# Patient Record
Sex: FEMALE | Race: WHITE | URBAN - METROPOLITAN AREA
[De-identification: names, ages, dates, MRNs, and addresses within clinical notes are randomized per-mention and may not be internally consistent; named-entity substitution may affect disease eponyms.]

---

## 2018-12-13 ENCOUNTER — APPOINTMENT (RX ONLY)
Dept: URBAN - METROPOLITAN AREA CLINIC 77 | Facility: CLINIC | Age: 63
Setting detail: DERMATOLOGY
End: 2018-12-13

## 2018-12-13 DIAGNOSIS — Z41.9 ENCOUNTER FOR PROCEDURE FOR PURPOSES OTHER THAN REMEDYING HEALTH STATE, UNSPECIFIED: ICD-10-CM

## 2018-12-13 PROCEDURE — ? MEDICAL CONSULTATION: FILLERS

## 2018-12-13 PROCEDURE — ? ADDITIONAL NOTES

## 2018-12-13 ASSESSMENT — LOCATION DETAILED DESCRIPTION DERM: LOCATION DETAILED: LEFT MEDIAL MALAR CHEEK

## 2018-12-13 ASSESSMENT — LOCATION ZONE DERM: LOCATION ZONE: FACE

## 2018-12-13 ASSESSMENT — LOCATION SIMPLE DESCRIPTION DERM: LOCATION SIMPLE: LEFT CHEEK

## 2018-12-13 NOTE — PROCEDURE: ADDITIONAL NOTES
Additional Notes: Patient was consulted on cosmetic fillers today for mid-face volume loss. Patient desired treatment today, however was not aware of pricing stating our prices are higher than expected or that she has had in the past.\\n\\n$800 per syringe discussed. Pt to schedule in future if she desires treatment.
Detail Level: Simple